# Patient Record
Sex: FEMALE | NOT HISPANIC OR LATINO | Employment: UNEMPLOYED
[De-identification: names, ages, dates, MRNs, and addresses within clinical notes are randomized per-mention and may not be internally consistent; named-entity substitution may affect disease eponyms.]

---

## 2024-03-13 ENCOUNTER — TRANSCRIBE ORDERS (OUTPATIENT)
Dept: OTHER | Age: 34
End: 2024-03-13

## 2024-03-13 DIAGNOSIS — R51.9 NONINTRACTABLE EPISODIC HEADACHE, UNSPECIFIED HEADACHE TYPE: Primary | ICD-10-CM

## 2024-03-18 ENCOUNTER — TRANSCRIBE ORDERS (OUTPATIENT)
Dept: URGENT CARE | Facility: URGENT CARE | Age: 34
End: 2024-03-18

## 2024-03-18 DIAGNOSIS — M54.41 ACUTE BILATERAL LOW BACK PAIN WITH RIGHT-SIDED SCIATICA: Primary | ICD-10-CM

## 2024-05-14 ENCOUNTER — MEDICAL CORRESPONDENCE (OUTPATIENT)
Dept: HEALTH INFORMATION MANAGEMENT | Facility: CLINIC | Age: 34
End: 2024-05-14

## 2024-05-14 ENCOUNTER — OFFICE VISIT (OUTPATIENT)
Dept: NEUROLOGY | Facility: CLINIC | Age: 34
End: 2024-05-14
Attending: FAMILY MEDICINE
Payer: COMMERCIAL

## 2024-05-14 VITALS
HEART RATE: 76 BPM | SYSTOLIC BLOOD PRESSURE: 138 MMHG | DIASTOLIC BLOOD PRESSURE: 90 MMHG | WEIGHT: 202 LBS | RESPIRATION RATE: 18 BRPM

## 2024-05-14 DIAGNOSIS — R51.9 NONINTRACTABLE EPISODIC HEADACHE, UNSPECIFIED HEADACHE TYPE: ICD-10-CM

## 2024-05-14 DIAGNOSIS — E53.8 LOW SERUM VITAMIN B12: Primary | ICD-10-CM

## 2024-05-14 PROCEDURE — 99245 OFF/OP CONSLTJ NEW/EST HI 55: CPT | Performed by: PSYCHIATRY & NEUROLOGY

## 2024-05-14 RX ORDER — CYANOCOBALAMIN 1000 UG/ML
1000 INJECTION, SOLUTION INTRAMUSCULAR; SUBCUTANEOUS
COMMUNITY
Start: 2022-11-01 | End: 2024-05-14

## 2024-05-14 RX ORDER — NORTRIPTYLINE HCL 10 MG
CAPSULE ORAL
Qty: 180 CAPSULE | Refills: 1 | Status: SHIPPED | OUTPATIENT
Start: 2024-05-14

## 2024-05-14 RX ORDER — CYANOCOBALAMIN 1000 UG/ML
1000 INJECTION, SOLUTION INTRAMUSCULAR; SUBCUTANEOUS
Qty: 12 ML | Refills: 3 | Status: SHIPPED | OUTPATIENT
Start: 2024-05-14

## 2024-05-14 RX ORDER — CEFUROXIME AXETIL 500 MG/1
500 TABLET ORAL DAILY
COMMUNITY
Start: 2024-05-13

## 2024-05-14 RX ORDER — INSULIN DETEMIR 100 [IU]/ML
INJECTION, SOLUTION SUBCUTANEOUS
COMMUNITY
Start: 2024-04-11

## 2024-05-14 RX ORDER — ACETAMINOPHEN 500 MG
1000 TABLET ORAL
COMMUNITY

## 2024-05-14 RX ORDER — RIZATRIPTAN BENZOATE 10 MG/1
10 TABLET ORAL
Qty: 18 TABLET | Refills: 1 | Status: SHIPPED | OUTPATIENT
Start: 2024-05-14

## 2024-05-14 RX ORDER — LEVONORGESTREL 52 MG/1
INTRAUTERINE DEVICE INTRAUTERINE
COMMUNITY

## 2024-05-14 RX ORDER — ERGOCALCIFEROL 1.25 MG/1
50000 CAPSULE, LIQUID FILLED ORAL
COMMUNITY

## 2024-05-14 RX ORDER — DOXYCYCLINE 100 MG/1
100 CAPSULE ORAL DAILY
COMMUNITY
Start: 2024-05-13

## 2024-05-14 NOTE — PROGRESS NOTES
NEUROLOGY OUTPATIENT CONSULT NOTE   May 14, 2024     CHIEF COMPLAINT/REASON FOR VISIT/REASON FOR CONSULT  Patient presents with:  Headache    REASON FOR CONSULTATION-headaches    REFERRAL SOURCE  Dr. Nubia Nogueira  CC Dr. Nubia Nogueira    HISTORY OF PRESENT ILLNESS  Emilie Jacob is a 33 year old female seen today for evaluation of headaches.  She has had headaches for several years and has been progressively getting worse.  Reports that headaches are almost every day now.  She gets up in the morning with the headaches.  Generally the headaches start in the back of the head and then can moved to the top of the head and the forehead and eyes.  They are more of a throbbing pain.  There is photophobia and phonophobia.  Occasionally when the headaches are more severe she can have nausea.  There is no visual auras with the headaches.  They can last a long time.  She does have to take Tylenol multiple times for things to work.  Has not found any triggers for headaches.  Does have B12 deficiency and is doing injections with low B12 level.  Ferritin also is low at 24.  Has not tried any prescription medications for headaches.  Never had a scan of the brain.  She is on birth control which she does not feel affects her headaches.    Previous history is reviewed and this is unchanged.    PAST MEDICAL/SURGICAL HISTORY  History reviewed. No pertinent past medical history.  There is no problem list on file for this patient.  Significant for diabetes and arthritis  History of autoimmune gastritis    FAMILY HISTORY  History reviewed. No pertinent family history.  Negative for headaches    SOCIAL HISTORY  Social History     Tobacco Use    Smoking status: Never    Smokeless tobacco: Never   Substance Use Topics    Alcohol use: Not Currently    Drug use: Never       SYSTEMS REVIEW  Twelve-system ROS was done and other than the HPI this was negative/positive for neck pain, back pain, arm and leg pain, headaches, chest  pain, stomach pain.  She is sleeping well at night.  Neck pain is not very severe.    MEDICATIONS  Current Outpatient Medications   Medication Sig Dispense Refill    acetaminophen (TYLENOL) 500 MG tablet Take 1,000 mg by mouth      cefuroxime (CEFTIN) 500 MG tablet Take 500 mg by mouth daily      cyanocobalamin (CYANOCOBALAMIN) 1000 MCG/ML injection Inject 1 mL (1,000 mcg) into the muscle every 7 days 12 mL 3    doxycycline hyclate (VIBRAMYCIN) 100 MG capsule Take 100 mg by mouth daily      LEVEMIR FLEXTOUCH 100 UNIT/ML pen 80 in AM 45 in PM      levonorgestrel (MIRENA, 52 MG,) 52 MG (20 mcg/day) IUD Take by intrauterine route.      nortriptyline (PAMELOR) 10 MG capsule Take 1 cap/night and then increase by 1 cap/week to a max of 3 caps/night as needed and tolerated. 180 capsule 1    rizatriptan (MAXALT) 10 MG tablet Take 1 tablet (10 mg) by mouth at onset of headache for migraine May repeat in 2 hours. 18 tablet 1    vitamin D2 (ERGOCALCIFEROL) 51333 units (1250 mcg) capsule Take 50,000 Units by mouth every 7 days       No current facility-administered medications for this visit.        PHYSICAL EXAMINATION  VITALS: BP (!) 138/90   Pulse 76   Resp 18   Wt 91.6 kg (202 lb)   GENERAL: Healthy appearing, alert, no acute distress, normal habitus.  CARDIOVASCULAR: Extremities warm and well perfused. Pulses present.   NEUROLOGICAL:  Patient is awake and oriented to self, place and time.  Attention span is normal.  Memory is grossly intact.  Language is fluent and follows commands appropriately.  Appropriate fund of knowledge. Cranial nerves 2-12 are intact. There is no pronator drift.  Motor exam shows 5/5 strength in all extremities.  Tone is symmetric bilaterally in upper and lower extremities.  Reflexes are symmetric and 1+ in upper extremities and lower extremities. Sensory exam is grossly intact to light touch, pin prick and vibration.  Finger to nose and heel to shin is without dysmetria.  Romberg is negative.   Gait is normal and the patient is able to do tandem walk and walk on toes and heels.        DIAGNOSTICS  RELEVANT LABS  BMP shows slightly elevated glucose  CMP shows more elevated glucose  CBC noncontributory  B12 356  Ferritin 24    OUTSIDE RECORDS  Outside referral notes and chart notes were reviewed and pertinent information has been summarized (in addition to the HPI):-      IMPRESSION/REPORT/PLAN  Low ferritin  Low serum vitamin B12  Chronic migraines without aura    This is a 33 year old female with chronic headache suggestive of chronic migraines without aura.  Blood work has shown low vitamin B12 and low ferritin.  She does have a history of autoimmune gastritis.  Exam today is noncontributory.  Will check an MRI of the brain to look rule out any structural lesions given worsening headaches.  Discussed causes of migraines.  Encouraged her to keep a log to identify any patterns.  Did discuss common triggers for migraines.    To help prevent the headaches will increase the dose of the B12 injections since her level is still low/low normal despite the injections.  Could consider iron supplementation for low ferritin.    For prevention of headaches we will start her on nortriptyline.  Use Maxalt for abortive therapy.  She has not tried any prescription medications before.    I can see her back in 5 months after testing.  She plans to travel over the summer and will schedule an appointment when she comes back.    -     cyanocobalamin (CYANOCOBALAMIN) 1000 MCG/ML injection; Inject 1 mL (1,000 mcg) into the muscle every 7 days  -     MR Brain w/o Contrast; Future  -     nortriptyline (PAMELOR) 10 MG capsule; Take 1 cap/night and then increase by 1 cap/week to a max of 3 caps/night as needed and tolerated.  -     rizatriptan (MAXALT) 10 MG tablet; Take 1 tablet (10 mg) by mouth at onset of headache for migraine May repeat in 2 hours.    Return in about 5 months (around 10/14/2024) for In-Clinic Visit  (must).    Over 60 minutes were spent coordinating the care for the patient on the day of the encounter.  This includes previsit, during visit and post visit activities as documented above.  Counseling patient.  Reviewing chart.  Testing ordered.  Use of  requires extra time.  (Activities include but not inclusive of reviewing chart, reviewing outside records, reviewing labs and imaging study results as well as the images, patient visit time including getting history and exam,  use if applicable, review of test results with the patient and coming up with a plan in a shared model, counseling patient and family, education and answering patient questions, EMR , EMR diagnosis entry and problem list management, medication reconciliation and prescription management if applicable, paperwork if applicable, printing documents and documentation of the visit activities.)        Matt White MD  Neurologist  [facility]  Tel:- [phone]    This note was dictated using voice recognition software.  Any grammatical or context distortions are unintentional and inherent to the software.

## 2024-05-14 NOTE — LETTER
5/14/2024         RE: Emilie Jacob  [address]  [place] [address]        Dear Colleague,    Thank you for referring your patient, Emilie Jacob, to the [facility] [place]. Please see a copy of my visit note below.    NEUROLOGY OUTPATIENT CONSULT NOTE   May 14, 2024     CHIEF COMPLAINT/REASON FOR VISIT/REASON FOR CONSULT  Patient presents with:  Headache    REASON FOR CONSULTATION-headaches    REFERRAL SOURCE  Dr. Nubia Nogueira  CC Dr. Nubia Nogueira    HISTORY OF PRESENT ILLNESS  Emilie Jacob is a 33 year old female seen today for evaluation of headaches.  She has had headaches for several years and has been progressively getting worse.  Reports that headaches are almost every day now.  She gets up in the morning with the headaches.  Generally the headaches start in the back of the head and then can moved to the top of the head and the forehead and eyes.  They are more of a throbbing pain.  There is photophobia and phonophobia.  Occasionally when the headaches are more severe she can have nausea.  There is no visual auras with the headaches.  They can last a long time.  She does have to take Tylenol multiple times for things to work.  Has not found any triggers for headaches.  Does have B12 deficiency and is doing injections with low B12 level.  Ferritin also is low at 24.  Has not tried any prescription medications for headaches.  Never had a scan of the brain.  She is on birth control which she does not feel affects her headaches.    Previous history is reviewed and this is unchanged.    PAST MEDICAL/SURGICAL HISTORY  History reviewed. No pertinent past medical history.  There is no problem list on file for this patient.  Significant for diabetes and arthritis  History of autoimmune gastritis    FAMILY HISTORY  History reviewed. No pertinent family history.  Negative for headaches    SOCIAL HISTORY  Social History     Tobacco Use     Smoking  status: Never     Smokeless tobacco: Never   Substance Use Topics     Alcohol use: Not Currently     Drug use: Never       SYSTEMS REVIEW  Twelve-system ROS was done and other than the HPI this was negative/positive for neck pain, back pain, arm and leg pain, headaches, chest pain, stomach pain.  She is sleeping well at night.  Neck pain is not very severe.    MEDICATIONS  Current Outpatient Medications   Medication Sig Dispense Refill     acetaminophen (TYLENOL) 500 MG tablet Take 1,000 mg by mouth       cefuroxime (CEFTIN) 500 MG tablet Take 500 mg by mouth daily       cyanocobalamin (CYANOCOBALAMIN) 1000 MCG/ML injection Inject 1 mL (1,000 mcg) into the muscle every 7 days 12 mL 3     doxycycline hyclate (VIBRAMYCIN) 100 MG capsule Take 100 mg by mouth daily       LEVEMIR FLEXTOUCH 100 UNIT/ML pen 80 in AM 45 in PM       levonorgestrel (MIRENA, 52 MG,) 52 MG (20 mcg/day) IUD Take by intrauterine route.       nortriptyline (PAMELOR) 10 MG capsule Take 1 cap/night and then increase by 1 cap/week to a max of 3 caps/night as needed and tolerated. 180 capsule 1     rizatriptan (MAXALT) 10 MG tablet Take 1 tablet (10 mg) by mouth at onset of headache for migraine May repeat in 2 hours. 18 tablet 1     vitamin D2 (ERGOCALCIFEROL) 85310 units (1250 mcg) capsule Take 50,000 Units by mouth every 7 days       No current facility-administered medications for this visit.        PHYSICAL EXAMINATION  VITALS: BP (!) 138/90   Pulse 76   Resp 18   Wt 91.6 kg (202 lb)   GENERAL: Healthy appearing, alert, no acute distress, normal habitus.  CARDIOVASCULAR: Extremities warm and well perfused. Pulses present.   NEUROLOGICAL:  Patient is awake and oriented to self, place and time.  Attention span is normal.  Memory is grossly intact.  Language is fluent and follows commands appropriately.  Appropriate fund of knowledge. Cranial nerves 2-12 are intact. There is no pronator drift.  Motor exam shows 5/5 strength in all extremities.   Tone is symmetric bilaterally in upper and lower extremities.  Reflexes are symmetric and 1+ in upper extremities and lower extremities. Sensory exam is grossly intact to light touch, pin prick and vibration.  Finger to nose and heel to shin is without dysmetria.  Romberg is negative.  Gait is normal and the patient is able to do tandem walk and walk on toes and heels.        DIAGNOSTICS  RELEVANT LABS  BMP shows slightly elevated glucose  CMP shows more elevated glucose  CBC noncontributory  B12 356  Ferritin 24    OUTSIDE RECORDS  Outside referral notes and chart notes were reviewed and pertinent information has been summarized (in addition to the HPI):-      IMPRESSION/REPORT/PLAN  Low ferritin  Low serum vitamin B12  Chronic migraines without aura    This is a 33 year old female with chronic headache suggestive of chronic migraines without aura.  Blood work has shown low vitamin B12 and low ferritin.  She does have a history of autoimmune gastritis.  Exam today is noncontributory.  Will check an MRI of the brain to look rule out any structural lesions given worsening headaches.  Discussed causes of migraines.  Encouraged her to keep a log to identify any patterns.  Did discuss common triggers for migraines.    To help prevent the headaches will increase the dose of the B12 injections since her level is still low/low normal despite the injections.  Could consider iron supplementation for low ferritin.    For prevention of headaches we will start her on nortriptyline.  Use Maxalt for abortive therapy.  She has not tried any prescription medications before.    I can see her back in 5 months after testing.  She plans to travel over the summer and will schedule an appointment when she comes back.    -     cyanocobalamin (CYANOCOBALAMIN) 1000 MCG/ML injection; Inject 1 mL (1,000 mcg) into the muscle every 7 days  -     MR Brain w/o Contrast; Future  -     nortriptyline (PAMELOR) 10 MG capsule; Take 1 cap/night and  then increase by 1 cap/week to a max of 3 caps/night as needed and tolerated.  -     rizatriptan (MAXALT) 10 MG tablet; Take 1 tablet (10 mg) by mouth at onset of headache for migraine May repeat in 2 hours.    Return in about 5 months (around 10/14/2024) for In-Clinic Visit (must).    Over 60 minutes were spent coordinating the care for the patient on the day of the encounter.  This includes previsit, during visit and post visit activities as documented above.  Counseling patient.  Reviewing chart.  Testing ordered.  Use of  requires extra time.  (Activities include but not inclusive of reviewing chart, reviewing outside records, reviewing labs and imaging study results as well as the images, patient visit time including getting history and exam,  use if applicable, review of test results with the patient and coming up with a plan in a shared model, counseling patient and family, education and answering patient questions, EMR , EMR diagnosis entry and problem list management, medication reconciliation and prescription management if applicable, paperwork if applicable, printing documents and documentation of the visit activities.)        Matt White MD  Neurologist  [facility]  Tel:- [phone]    This note was dictated using voice recognition software.  Any grammatical or context distortions are unintentional and inherent to the software.      Again, thank you for allowing me to participate in the care of your patient.        Sincerely,        Matt White MD

## 2025-06-08 ENCOUNTER — HEALTH MAINTENANCE LETTER (OUTPATIENT)
Age: 35
End: 2025-06-08

## 2025-06-16 DIAGNOSIS — E53.8 LOW SERUM VITAMIN B12: ICD-10-CM

## 2025-06-16 NOTE — LETTER
6/16/2025      Emilie Jacob  [address]  [place] [address]                Dear Emilie,         We recently provided you with medication refills.  Many medications require routine follow-up with your doctor. As our neurologists are booking out several (6+) months, please contact us at your earliest convenience at [phone] to avoid any interruptions in your medication refills.     Your prescription(s) have been refilled for 30 days so you may have time for the above noted follow-up. Please call to schedule soon so we can assure you have an appointment before your next refills are needed. If you have already made a follow up appointment, please disregard this letter.             Sincerely,  [facility]   Dr. White's Care Team

## 2025-06-19 RX ORDER — CYANOCOBALAMIN 1000 UG/ML
INJECTION, SOLUTION INTRAMUSCULAR; SUBCUTANEOUS
Qty: 4 ML | Refills: 0 | Status: SHIPPED | OUTPATIENT
Start: 2025-06-19

## 2025-06-19 NOTE — TELEPHONE ENCOUNTER
Refill request for: cyanocobalamin (CYANOCOBALAMIN) 1000 MCG/ML injection    Directions: Inject 1 mL (1,000 mcg) into the muscle every 7 days - Intramuscular     LOV: 05/14/24  NOV: Due for follow up appt. Letter mailed to pt to remind to schedule.    Last Vit B12 level checked on 4/23/24, it was 356.    30 day supply with 0 refills Medication T'd for review and signature    Renetta Jung LPN on 6/19/2025 at 2:24 PM